# Patient Record
Sex: FEMALE | Race: OTHER | NOT HISPANIC OR LATINO | Employment: UNEMPLOYED | URBAN - METROPOLITAN AREA
[De-identification: names, ages, dates, MRNs, and addresses within clinical notes are randomized per-mention and may not be internally consistent; named-entity substitution may affect disease eponyms.]

---

## 2021-04-26 ENCOUNTER — HISTORICAL (OUTPATIENT)
Dept: ADMINISTRATIVE | Facility: HOSPITAL | Age: 31
End: 2021-04-26

## 2021-07-12 ENCOUNTER — HISTORICAL (OUTPATIENT)
Dept: ADMINISTRATIVE | Facility: HOSPITAL | Age: 31
End: 2021-07-12

## 2022-06-01 ENCOUNTER — OFFICE VISIT (OUTPATIENT)
Dept: ORTHOPEDICS | Facility: CLINIC | Age: 32
End: 2022-06-01
Payer: COMMERCIAL

## 2022-06-01 DIAGNOSIS — M75.81 TENDINITIS OF RIGHT ROTATOR CUFF: Primary | ICD-10-CM

## 2022-06-01 PROCEDURE — 99213 PR OFFICE/OUTPT VISIT, EST, LEVL III, 20-29 MIN: ICD-10-PCS | Mod: 25,,, | Performed by: SPECIALIST

## 2022-06-01 PROCEDURE — 20610 DRAIN/INJ JOINT/BURSA W/O US: CPT | Mod: RT,,, | Performed by: SPECIALIST

## 2022-06-01 PROCEDURE — 1159F MED LIST DOCD IN RCRD: CPT | Mod: CPTII,,, | Performed by: SPECIALIST

## 2022-06-01 PROCEDURE — 1160F PR REVIEW ALL MEDS BY PRESCRIBER/CLIN PHARMACIST DOCUMENTED: ICD-10-PCS | Mod: CPTII,,, | Performed by: SPECIALIST

## 2022-06-01 PROCEDURE — 1159F PR MEDICATION LIST DOCUMENTED IN MEDICAL RECORD: ICD-10-PCS | Mod: CPTII,,, | Performed by: SPECIALIST

## 2022-06-01 PROCEDURE — 20610 LARGE JOINT ASPIRATION/INJECTION: R SUBACROMIAL BURSA: ICD-10-PCS | Mod: RT,,, | Performed by: SPECIALIST

## 2022-06-01 PROCEDURE — 1160F RVW MEDS BY RX/DR IN RCRD: CPT | Mod: CPTII,,, | Performed by: SPECIALIST

## 2022-06-01 PROCEDURE — 99213 OFFICE O/P EST LOW 20 MIN: CPT | Mod: 25,,, | Performed by: SPECIALIST

## 2022-06-01 RX ORDER — IBUPROFEN 100 MG/5ML
1000 SUSPENSION, ORAL (FINAL DOSE FORM) ORAL
COMMUNITY

## 2022-06-01 RX ORDER — ZINC SULFATE 50(220)MG
220 CAPSULE ORAL
COMMUNITY

## 2022-06-01 RX ORDER — MONTELUKAST SODIUM 10 MG/1
10 TABLET ORAL DAILY PRN
COMMUNITY

## 2022-06-01 RX ORDER — BETAMETHASONE SODIUM PHOSPHATE AND BETAMETHASONE ACETATE 3; 3 MG/ML; MG/ML
12 INJECTION, SUSPENSION INTRA-ARTICULAR; INTRALESIONAL; INTRAMUSCULAR; SOFT TISSUE
Status: DISCONTINUED | OUTPATIENT
Start: 2022-06-01 | End: 2022-06-01 | Stop reason: HOSPADM

## 2022-06-01 RX ORDER — LIDOCAINE HYDROCHLORIDE 20 MG/ML
5 INJECTION, SOLUTION EPIDURAL; INFILTRATION; INTRACAUDAL; PERINEURAL
Status: DISCONTINUED | OUTPATIENT
Start: 2022-06-01 | End: 2022-06-01 | Stop reason: HOSPADM

## 2022-06-01 RX ORDER — CHOLECALCIFEROL (VITAMIN D3) 25 MCG
1000 TABLET ORAL
COMMUNITY

## 2022-06-01 RX ORDER — CYCLOBENZAPRINE HCL 10 MG
10 TABLET ORAL EVERY 8 HOURS PRN
COMMUNITY
Start: 2022-04-12

## 2022-06-01 RX ADMIN — BETAMETHASONE SODIUM PHOSPHATE AND BETAMETHASONE ACETATE 12 MG: 3; 3 INJECTION, SUSPENSION INTRA-ARTICULAR; INTRALESIONAL; INTRAMUSCULAR; SOFT TISSUE at 10:06

## 2022-06-01 RX ADMIN — LIDOCAINE HYDROCHLORIDE 5 ML: 20 INJECTION, SOLUTION EPIDURAL; INFILTRATION; INTRACAUDAL; PERINEURAL at 10:06

## 2022-06-01 NOTE — PROCEDURES
Large Joint Aspiration/Injection: R subacromial bursa    Date/Time: 6/1/2022 10:00 AM  Performed by: Sree Jay MD  Authorized by: Sree Jay MD     Consent Done?:  Yes (Verbal)  Indications:  Pain  Timeout: prior to procedure the correct patient, procedure, and site was verified      Local anesthesia used?: Yes    Anesthesia:  Local infiltration  Local anesthetic:  Lidocaine 2% without epinephrine    Details:  Needle Size:  25 G  Ultrasonic Guidance for needle placement?: No    Approach:  Posterior  Location:  Shoulder  Site:  R subacromial bursa  Medications:  5 mL LIDOcaine (PF) 20 mg/mL (2%) 20 mg/mL (2 %); 12 mg betamethasone acetate-betamethasone sodium phosphate 6 mg/mL  Patient tolerance:  Patient tolerated the procedure well with no immediate complications

## 2022-06-01 NOTE — LETTER
June 1, 2022      [facility]  [address], [address]  [facility] [address]  Phone: [phone]       Patient: Cooper Gaming   YOB: 1990  Date of Visit: 06/01/2022    To Whom It May Concern:    Thomas Gaming  was at [facility] on 06/01/2022 with light duty restrictions. If you have any questions or concerns, or if I can be of further assistance, please do not hesitate to contact me.    Sincerely,    Cindi Bellamy MA

## 2022-06-01 NOTE — PROGRESS NOTES
Chief Complaint:   Chief Complaint   Patient presents with    Pain     right wrist, states it will throb at time, pain is on/off, reports tingleness more in the palm aread of the hand     Pain     right shoulder, has been ongoing for about a year, describes pain is leading up to the neck, patient had MRI done of shoulder in 07/2021         History of present illness:    This is a 32 y.o. year old female who complains of RIGHT SHOUDLER PAIN      No past medical history on file.    No past surgical history on file.    Current Outpatient Medications   Medication Instructions    ascorbic acid (vitamin C) (VITAMIN C) 1,000 mg, Oral    cyclobenzaprine (FLEXERIL) 10 mg, Oral, Every 8 hours PRN    montelukast (SINGULAIR) 10 mg, Oral, Daily PRN    vitamin D (VITAMIN D3) 1,000 Units, Oral    zinc sulfate (ZINCATE) 220 mg, Oral        Review of patient's allergies indicates:   Allergen Reactions    Iodine and iodide containing products Hives, Itching, Rash, Shortness Of Breath and Swelling     Hives and itching, swelling      Shellfish containing products Shortness Of Breath     Hives, itching swelling and SOB       No family history on file.        Review of Systems:    Constitution:   Denies chills, fever, and sweats.  HENT:   Denies headaches or blurry vision.  Cardiovascular:  Denies chest pain or irregular heart beat.  Respiratory:   Denies cough or shortness of breath.  Gastrointestinal:  Denies abdominal pain, nausea, or vomiting.  Musculoskeletal:   Denies muscle cramps.  Neurological:   Denies dizziness or focal weakness.  Psychiatric/Behavior: Normal mental status.  Hematology/Lymph:  Denies bleeding problem or easy bruising/bleeding.  Skin:    Denies rash or suspicious lesions.    Examination:    Vital Signs:  There were no vitals filed for this visit.    There is no height or weight on file to calculate BMI.    Constitution:   Well-developed, well nourished patient in no acute distress.  Neurological:    Alert and oriented x 3 and cooperative to examination.     Psychiatric/Behavior: Normal mental status.  Respiratory:   No shortness of breath.  Eyes:    Extraoccular muscles intact  Skin:    No scars, rash or suspicious lesions.    Musculoskeletal Exam :   Right wrist exam  Patient does have some mild tenderness over the pisiform bone area  She has good motor and sensation  Brisk capillary refill of the digits  Intact range of motion  Negative Tinel's     Assessment: Final diagnoses:  None   Right shoulder rotator cuff tendinitis  Right wrist strain  Plan:  Right shoulder injection    If the patient's right wrist continues to bother her in a month which comes back for her shoulder we will repeat her x-rays     DISCLAIMER: This note may have been dictated using voice recognition software and may contain grammatical errors.     NOTE: Consult report sent to referring provider via [employer].

## 2022-06-07 ENCOUNTER — TELEPHONE (OUTPATIENT)
Dept: ORTHOPEDICS | Facility: CLINIC | Age: 32
End: 2022-06-07

## 2022-07-12 ENCOUNTER — OFFICE VISIT (OUTPATIENT)
Dept: ORTHOPEDICS | Facility: CLINIC | Age: 32
End: 2022-07-12
Payer: COMMERCIAL

## 2022-07-12 VITALS — HEIGHT: 62 IN | BODY MASS INDEX: 38.64 KG/M2 | WEIGHT: 210 LBS

## 2022-07-12 DIAGNOSIS — M75.81 TENDINITIS OF RIGHT ROTATOR CUFF: Primary | ICD-10-CM

## 2022-07-12 PROCEDURE — 1159F PR MEDICATION LIST DOCUMENTED IN MEDICAL RECORD: ICD-10-PCS | Mod: CPTII,,, | Performed by: SPECIALIST

## 2022-07-12 PROCEDURE — 1160F PR REVIEW ALL MEDS BY PRESCRIBER/CLIN PHARMACIST DOCUMENTED: ICD-10-PCS | Mod: CPTII,,, | Performed by: SPECIALIST

## 2022-07-12 PROCEDURE — 20610 LARGE JOINT ASPIRATION/INJECTION: R SUBACROMIAL BURSA: ICD-10-PCS | Mod: RT,,, | Performed by: SPECIALIST

## 2022-07-12 PROCEDURE — 1160F RVW MEDS BY RX/DR IN RCRD: CPT | Mod: CPTII,,, | Performed by: SPECIALIST

## 2022-07-12 PROCEDURE — 3008F PR BODY MASS INDEX (BMI) DOCUMENTED: ICD-10-PCS | Mod: CPTII,,, | Performed by: SPECIALIST

## 2022-07-12 PROCEDURE — 1159F MED LIST DOCD IN RCRD: CPT | Mod: CPTII,,, | Performed by: SPECIALIST

## 2022-07-12 PROCEDURE — 3008F BODY MASS INDEX DOCD: CPT | Mod: CPTII,,, | Performed by: SPECIALIST

## 2022-07-12 PROCEDURE — 99213 OFFICE O/P EST LOW 20 MIN: CPT | Mod: 25,,, | Performed by: SPECIALIST

## 2022-07-12 PROCEDURE — 99213 PR OFFICE/OUTPT VISIT, EST, LEVL III, 20-29 MIN: ICD-10-PCS | Mod: 25,,, | Performed by: SPECIALIST

## 2022-07-12 PROCEDURE — 20610 DRAIN/INJ JOINT/BURSA W/O US: CPT | Mod: RT,,, | Performed by: SPECIALIST

## 2022-07-12 RX ORDER — LIDOCAINE HYDROCHLORIDE 20 MG/ML
5 INJECTION, SOLUTION EPIDURAL; INFILTRATION; INTRACAUDAL; PERINEURAL
Status: DISCONTINUED | OUTPATIENT
Start: 2022-07-12 | End: 2022-07-12 | Stop reason: HOSPADM

## 2022-07-12 RX ORDER — BETAMETHASONE SODIUM PHOSPHATE AND BETAMETHASONE ACETATE 3; 3 MG/ML; MG/ML
12 INJECTION, SUSPENSION INTRA-ARTICULAR; INTRALESIONAL; INTRAMUSCULAR; SOFT TISSUE
Status: DISCONTINUED | OUTPATIENT
Start: 2022-07-12 | End: 2022-07-12 | Stop reason: HOSPADM

## 2022-07-12 RX ADMIN — BETAMETHASONE SODIUM PHOSPHATE AND BETAMETHASONE ACETATE 12 MG: 3; 3 INJECTION, SUSPENSION INTRA-ARTICULAR; INTRALESIONAL; INTRAMUSCULAR; SOFT TISSUE at 10:07

## 2022-07-12 RX ADMIN — LIDOCAINE HYDROCHLORIDE 5 ML: 20 INJECTION, SOLUTION EPIDURAL; INFILTRATION; INTRACAUDAL; PERINEURAL at 10:07

## 2022-07-12 NOTE — PROGRESS NOTES
"Chief Complaint:   Chief Complaint   Patient presents with    Follow-up     F/U AFTER INJECTION IN RIGHT SHOULDER, LAST INJECT WAS 6/1/22 WITH SOME RELIEF, REPORTS IT LASTED ONLY ABOUT A WEEK, SHE STATES THERE IS PAIN WHEN SHE TRIES TO REACH UP, SHE IS REQUESTING MRI          History of present illness:    This is a 32 y.o. year old female who complains of right shoulder pian  Injectio gave her good relief for about 2 weeks  Her pain has returned      History reviewed. No pertinent past medical history.    History reviewed. No pertinent surgical history.    Current Outpatient Medications   Medication Instructions    ascorbic acid (vitamin C) (VITAMIN C) 1,000 mg, Oral    cyclobenzaprine (FLEXERIL) 10 mg, Oral, Every 8 hours PRN    montelukast (SINGULAIR) 10 mg, Oral, Daily PRN    vitamin D (VITAMIN D3) 1,000 Units, Oral    zinc sulfate (ZINCATE) 220 mg, Oral        Review of patient's allergies indicates:   Allergen Reactions    Iodine and iodide containing products Hives, Itching, Rash, Shortness Of Breath and Swelling     Hives and itching, swelling      Shellfish containing products Shortness Of Breath     Hives, itching swelling and SOB       History reviewed. No pertinent family history.        Review of Systems:    Constitution:   Denies chills, fever, and sweats.  HENT:   Denies headaches or blurry vision.  Cardiovascular:  Denies chest pain or irregular heart beat.  Respiratory:   Denies cough or shortness of breath.  Gastrointestinal:  Denies abdominal pain, nausea, or vomiting.  Musculoskeletal:   Denies muscle cramps.  Neurological:   Denies dizziness or focal weakness.  Psychiatric/Behavior: Normal mental status.  Hematology/Lymph:  Denies bleeding problem or easy bruising/bleeding.  Skin:    Denies rash or suspicious lesions.    Examination:    Vital Signs:    Vitals:    07/12/22 1056   Weight: 95.3 kg (210 lb)   Height: 5' 2" (1.575 m)       Body mass index is 38.41 kg/m².    Constitution: "   Well-developed, well nourished patient in no acute distress.  Neurological:   Alert and oriented x 3 and cooperative to examination.     Psychiatric/Behavior: Normal mental status.  Respiratory:   No shortness of breath.  Eyes:    Extraoccular muscles intact  Skin:    No scars, rash or suspicious lesions.    Musculoskeletal Exam :   PHYSICAL FINDING      Neck:    NO Pain radiating to the shoulder Precervical         Shoulder: right  .   ° NO atrophy of the deltoid muscle   ° NO atrophy of the supraspinatus muscle   ° NO atrophy of the infraspinatus muscle         right Shoulder: .  Acromial tenderness on palpation.  Tenderness on palpation of the subacromial bursa.  Tenderness on palpation of the deltoid muscle.  Tenderness on palpation of the supraspinatus muscle.  Tenderness on palpation of the infraspinatus muscle.   Tenderness on palpation at the bicipital groove.  Tenderness on palpation of the trapezius muscle.  Subacromial crepitus on motion was noted.       Right Shoulder:      Shoulder Motion: Value Normal Range         Active forward flexion       180 degrees   Active external rotation     60 degrees   Active internal rotation      50 degrees      ° NO swelling medial sternoclavicular.   ° NO swelling.   ° NO induration.   ° NO erythema.   ° NO long head biceps deformity.   ° NO winged scapula.   ° Motion was normal.   °  pain was elicited during a Neer impingement test.   °  pain was elicited during a Tavera-Yanick impingement test.         Clavicle:      General/bilateral: ° Acromioclavicular joint showed NO pain elicited by motion distal.      Right Clavicle:  Right sternoclavicular joint showed tenderness on palpation.  Right acromioclavicular joint showed tenderness on palpation.      Left Clavicle:  Left sternoclavicular joint showed tenderness on palpation.  Left acromioclavicular joint showed tenderness on palpation.         Neurological:       NO abnormalities were noted  Sensibility intact distally on right.   ° Oriented to time, place, and person.      Sensation:   ° NO sensory exam abnormalities were noted Decreased median sensation.   ° NO sensory exam abnormalities were noted Decreased ulnar sensation.   ° NO sensory exam abnormalities were noted Decreased radial sensation.      Motor (Strength):   ° weakness of the right shoulder was observed with resisted abduction  ° NO weakness of the left shoulder was observed.      Skin:   ° NO ecchymosis on the shoulders left.   ° NO ecchymosis on the shoulders right.      Injury / Incision Site: ° NO scar.      TESTS      Imaging:   reviewed MRI from a year ago  Partial tear of the rotator cuff      Assessment: Tendinitis of right rotator cuff  -     Large Joint Aspiration/Injection: R subacromial bursa        Plan:  Will repeat injection right shoulder today  HEP      DISCLAIMER: This note may have been dictated using voice recognition software and may contain grammatical errors.     NOTE: Consult report sent to referring provider via [employer].

## 2022-07-12 NOTE — PROCEDURES
Large Joint Aspiration/Injection: R subacromial bursa    Date/Time: 7/12/2022 10:15 AM  Performed by: Sree Jay MD  Authorized by: Sree Jay MD     Consent Done?:  Yes (Verbal)  Indications:  Pain  Timeout: prior to procedure the correct patient, procedure, and site was verified      Local anesthesia used?: Yes    Anesthesia:  Local infiltration  Local anesthetic:  Lidocaine 2% without epinephrine    Details:  Needle Size:  25 G  Ultrasonic Guidance for needle placement?: No    Approach:  Posterior  Location:  Shoulder  Site:  R subacromial bursa  Medications:  5 mL LIDOcaine (PF) 20 mg/mL (2%) 20 mg/mL (2 %); 12 mg betamethasone acetate-betamethasone sodium phosphate 6 mg/mL  Patient tolerance:  Patient tolerated the procedure well with no immediate complications

## 2022-07-22 ENCOUNTER — OFFICE VISIT (OUTPATIENT)
Dept: ORTHOPEDICS | Facility: CLINIC | Age: 32
End: 2022-07-22
Payer: COMMERCIAL

## 2022-07-22 DIAGNOSIS — M25.519 SHOULDER PAIN, UNSPECIFIED CHRONICITY, UNSPECIFIED LATERALITY: ICD-10-CM

## 2022-07-22 DIAGNOSIS — M75.81 TENDINITIS OF RIGHT ROTATOR CUFF: Primary | ICD-10-CM

## 2022-07-22 DIAGNOSIS — M25.539 PAIN IN WRIST, UNSPECIFIED LATERALITY: ICD-10-CM

## 2022-07-22 PROCEDURE — 1160F PR REVIEW ALL MEDS BY PRESCRIBER/CLIN PHARMACIST DOCUMENTED: ICD-10-PCS | Mod: CPTII,,, | Performed by: PHYSICIAN ASSISTANT

## 2022-07-22 PROCEDURE — 1159F MED LIST DOCD IN RCRD: CPT | Mod: CPTII,,, | Performed by: PHYSICIAN ASSISTANT

## 2022-07-22 PROCEDURE — 99213 PR OFFICE/OUTPT VISIT, EST, LEVL III, 20-29 MIN: ICD-10-PCS | Mod: ,,, | Performed by: PHYSICIAN ASSISTANT

## 2022-07-22 PROCEDURE — 1159F PR MEDICATION LIST DOCUMENTED IN MEDICAL RECORD: ICD-10-PCS | Mod: CPTII,,, | Performed by: PHYSICIAN ASSISTANT

## 2022-07-22 PROCEDURE — 99213 OFFICE O/P EST LOW 20 MIN: CPT | Mod: ,,, | Performed by: PHYSICIAN ASSISTANT

## 2022-07-22 PROCEDURE — 1160F RVW MEDS BY RX/DR IN RCRD: CPT | Mod: CPTII,,, | Performed by: PHYSICIAN ASSISTANT
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
## 2022-07-22 NOTE — LETTER
[facility]  [address] [place] [address] [address]  [place], [address]  Phone: [phone]  Fax: [phone]    Name:Cooper Gaming  :1990   Date:2022     PATIENT IS UNABLE TO WORK AS OF: 2022  [_] Pending treatment.  [X] For approximately [_] Days [3] Weeks [_] Months  [X] Pending diagnostic testing.  [_] Pending surgical treatment.  [_] For approximately _ months (Post Surgery)    PATIENT IS ABLE TO RETURN TO WORK AS OF:*    RE EVALUATION IN THREE WEEKS OF HER NEXT APPOINTMENT         COMMENTS      NEREYDA ODONNELL PA-C

## 2022-08-12 ENCOUNTER — TELEPHONE (OUTPATIENT)
Dept: ORTHOPEDICS | Facility: CLINIC | Age: 32
End: 2022-08-12
Payer: COMMERCIAL

## 2022-08-12 NOTE — TELEPHONE ENCOUNTER
Patient called stating that she is not able to proceed with doing her MRI because she would have to pay out of pocket, she instead would like to come in on her scheduled appointment to discuss about her hand.

## 2022-08-16 ENCOUNTER — OFFICE VISIT (OUTPATIENT)
Dept: ORTHOPEDICS | Facility: CLINIC | Age: 32
End: 2022-08-16
Payer: COMMERCIAL

## 2022-08-16 VITALS — BODY MASS INDEX: 38.64 KG/M2 | HEIGHT: 62 IN | WEIGHT: 210 LBS

## 2022-08-16 DIAGNOSIS — M75.81 TENDINITIS OF RIGHT ROTATOR CUFF: ICD-10-CM

## 2022-08-16 DIAGNOSIS — G56.01 CARPAL TUNNEL SYNDROME, RIGHT: Primary | ICD-10-CM

## 2022-08-16 PROCEDURE — 3008F BODY MASS INDEX DOCD: CPT | Mod: CPTII,,, | Performed by: SPECIALIST

## 2022-08-16 PROCEDURE — 99213 PR OFFICE/OUTPT VISIT, EST, LEVL III, 20-29 MIN: ICD-10-PCS | Mod: ,,, | Performed by: SPECIALIST

## 2022-08-16 PROCEDURE — 1160F PR REVIEW ALL MEDS BY PRESCRIBER/CLIN PHARMACIST DOCUMENTED: ICD-10-PCS | Mod: CPTII,,, | Performed by: SPECIALIST

## 2022-08-16 PROCEDURE — 1159F PR MEDICATION LIST DOCUMENTED IN MEDICAL RECORD: ICD-10-PCS | Mod: CPTII,,, | Performed by: SPECIALIST

## 2022-08-16 PROCEDURE — 1159F MED LIST DOCD IN RCRD: CPT | Mod: CPTII,,, | Performed by: SPECIALIST

## 2022-08-16 PROCEDURE — 3008F PR BODY MASS INDEX (BMI) DOCUMENTED: ICD-10-PCS | Mod: CPTII,,, | Performed by: SPECIALIST

## 2022-08-16 PROCEDURE — 1160F RVW MEDS BY RX/DR IN RCRD: CPT | Mod: CPTII,,, | Performed by: SPECIALIST

## 2022-08-16 PROCEDURE — 99213 OFFICE O/P EST LOW 20 MIN: CPT | Mod: ,,, | Performed by: SPECIALIST

## 2022-08-16 NOTE — PROGRESS NOTES
"Chief Complaint:   Chief Complaint   Patient presents with    Follow-up     right hand/shoulder, patient was unable to do her MRI, right shoulder last inject was 7/12/22, right hand numbness, pt states no changes to right shoulder.          History of present illness:    This is a 32 y.o. year old female who complains of right hand numbness now as her main c/o  Shoulder symptoms remain the same    Has not done her MRI of the shoulder or wrist      History reviewed. No pertinent past medical history.    History reviewed. No pertinent surgical history.    Current Outpatient Medications   Medication Instructions    ascorbic acid (vitamin C) (VITAMIN C) 1,000 mg, Oral    cyclobenzaprine (FLEXERIL) 10 mg, Oral, Every 8 hours PRN    montelukast (SINGULAIR) 10 mg, Oral, Daily PRN    vitamin D (VITAMIN D3) 1,000 Units, Oral    zinc sulfate (ZINCATE) 220 mg, Oral        Review of patient's allergies indicates:   Allergen Reactions    Iodine and iodide containing products Hives, Itching, Rash, Shortness Of Breath and Swelling     Hives and itching, swelling      Shellfish containing products Shortness Of Breath     Hives, itching swelling and SOB       History reviewed. No pertinent family history.        Review of Systems:    Constitution:   Denies chills, fever, and sweats.  HENT:   Denies headaches or blurry vision.  Cardiovascular:  Denies chest pain or irregular heart beat.  Respiratory:   Denies cough or shortness of breath.  Gastrointestinal:  Denies abdominal pain, nausea, or vomiting.  Musculoskeletal:   Denies muscle cramps.  Neurological:   Denies dizziness or focal weakness.  Psychiatric/Behavior: Normal mental status.  Hematology/Lymph:  Denies bleeding problem or easy bruising/bleeding.  Skin:    Denies rash or suspicious lesions.    Examination:    Vital Signs:    Vitals:    08/16/22 1356   Weight: 95.3 kg (210 lb)   Height: 5' 2" (1.575 m)       Body mass index is 38.41 kg/m².    Constitution: "   Well-developed, well nourished patient in no acute distress.  Neurological:   Alert and oriented x 3 and cooperative to examination.     Psychiatric/Behavior: Normal mental status.  Respiratory:   No shortness of breath.  Eyes:    Extraoccular muscles intact  Skin:    No scars, rash or suspicious lesions.    Musculoskeletal Exam :   No obvious deformity.  Negative tenderness over distal radius.  Supination and pronation to 90 degrees and 90 degrees, respectively.  Wrist flexion to 90 degrees and wrist extension to 70 degree.  Mildly positive Tinel's test.  Negative Finkelstein's test.  5/5 strength, normal skin appearance and palpable pulses    right Shoulder:     No swelling, erythema or increased heat    Tender over deltoid, supraspinatus and infraspinatus    Tender over bicipital groove    negative drop arm test Positive Neer and Tavera impingement signs    No weakness with rotator cuff resistance   Active shoulder abduction 100  Active forward elevation 180  Active internal rotation 40   Active external rotation 50       Assessment: Carpal tunnel syndrome, right    Tendinitis of right rotator cuff        Plan:    Continue with [facility] for shoulder stretching  Brace at night for her mild CTS symptoms    Will f/u prn    Return to work 8/22/22      DISCLAIMER: This note may have been dictated using voice recognition software and may contain grammatical errors.     NOTE: Consult report sent to referring provider via [employer].

## 2022-08-19 ENCOUNTER — TELEPHONE (OUTPATIENT)
Dept: ORTHOPEDICS | Facility: CLINIC | Age: 32
End: 2022-08-19
Payer: COMMERCIAL

## 2022-08-19 NOTE — TELEPHONE ENCOUNTER
Patient had called left a  regarding MRI, I returned patients call, she does not have a voicemail setup,

## 2022-09-01 ENCOUNTER — DOCUMENTATION ONLY (OUTPATIENT)
Dept: ORTHOPEDICS | Facility: CLINIC | Age: 32
End: 2022-09-01
Payer: COMMERCIAL

## 2022-12-20 ENCOUNTER — HOSPITAL ENCOUNTER (EMERGENCY)
Facility: HOSPITAL | Age: 32
Discharge: LEFT BEFORE DISCHARGE | End: 2022-12-20
Attending: FAMILY MEDICINE
Payer: MEDICAID

## 2022-12-20 VITALS
DIASTOLIC BLOOD PRESSURE: 84 MMHG | WEIGHT: 216.5 LBS | HEIGHT: 62 IN | BODY MASS INDEX: 39.84 KG/M2 | TEMPERATURE: 97 F | RESPIRATION RATE: 20 BRPM | HEART RATE: 110 BPM | OXYGEN SATURATION: 98 % | SYSTOLIC BLOOD PRESSURE: 145 MMHG

## 2022-12-20 LAB
APPEARANCE UR: ABNORMAL
B-HCG UR QL: NEGATIVE
BILIRUB UR QL STRIP.AUTO: ABNORMAL
COLOR UR AUTO: ABNORMAL
CTP QC/QA: YES
GLUCOSE UR QL STRIP.AUTO: ABNORMAL
KETONES UR QL STRIP.AUTO: ABNORMAL
LEUKOCYTE ESTERASE UR QL STRIP.AUTO: ABNORMAL
NITRITE UR QL STRIP.AUTO: ABNORMAL
PH UR STRIP.AUTO: 6.5 [PH]
PROT UR QL STRIP.AUTO: ABNORMAL
RBC #/AREA URNS AUTO: >100 /HPF
RBC UR QL AUTO: ABNORMAL UNIT/L
SP GR UR STRIP.AUTO: 1.02
SQUAMOUS #/AREA URNS LPF: ABNORMAL /HPF
UROBILINOGEN UR STRIP-ACNC: ABNORMAL
WBC #/AREA URNS AUTO: ABNORMAL /HPF

## 2022-12-20 PROCEDURE — 99900041 HC LEFT WITHOUT BEING SEEN- EMERGENCY

## 2022-12-20 PROCEDURE — 81025 URINE PREGNANCY TEST: CPT | Performed by: FAMILY MEDICINE

## 2022-12-20 PROCEDURE — 81001 URINALYSIS AUTO W/SCOPE: CPT | Performed by: FAMILY MEDICINE

## 2022-12-20 PROCEDURE — 87088 URINE BACTERIA CULTURE: CPT | Performed by: FAMILY MEDICINE

## 2022-12-22 LAB
BACTERIA UR CULT: ABNORMAL

## 2023-03-23 ENCOUNTER — LAB REQUISITION (OUTPATIENT)
Dept: LAB | Facility: HOSPITAL | Age: 33
End: 2023-03-23
Payer: MEDICAID

## 2023-03-23 DIAGNOSIS — H60.392 OTHER INFECTIVE OTITIS EXTERNA, LEFT EAR: ICD-10-CM

## 2023-03-23 PROCEDURE — 87102 FUNGUS ISOLATION CULTURE: CPT | Performed by: OTOLARYNGOLOGY

## 2023-03-23 PROCEDURE — 87205 SMEAR GRAM STAIN: CPT | Performed by: OTOLARYNGOLOGY

## 2023-03-23 PROCEDURE — 87070 CULTURE OTHR SPECIMN AEROBIC: CPT | Performed by: OTOLARYNGOLOGY

## 2023-03-24 LAB — GRAM STN SPEC: NORMAL

## 2023-04-17 LAB — BACTERIA DEEP WND CULT: ABNORMAL

## 2023-04-20 ENCOUNTER — TELEPHONE (OUTPATIENT)
Dept: ORTHOPEDICS | Facility: CLINIC | Age: 33
End: 2023-04-20
Payer: MEDICAID

## 2023-04-24 LAB — FUNGUS SPEC CULT: NORMAL

## 2024-01-23 ENCOUNTER — HOSPITAL ENCOUNTER (OUTPATIENT)
Dept: RADIOLOGY | Facility: CLINIC | Age: 34
Discharge: HOME | End: 2024-01-23
Attending: SPECIALIST
Payer: COMMERCIAL

## 2024-01-23 ENCOUNTER — OFFICE VISIT (OUTPATIENT)
Dept: ORTHOPEDICS | Facility: CLINIC | Age: 34
End: 2024-01-23
Payer: COMMERCIAL

## 2024-01-23 VITALS
DIASTOLIC BLOOD PRESSURE: 85 MMHG | WEIGHT: 228.63 LBS | HEART RATE: 83 BPM | BODY MASS INDEX: 42.07 KG/M2 | HEIGHT: 62 IN | SYSTOLIC BLOOD PRESSURE: 129 MMHG

## 2024-01-23 DIAGNOSIS — M25.511 RIGHT SHOULDER PAIN, UNSPECIFIED CHRONICITY: Primary | ICD-10-CM

## 2024-01-23 DIAGNOSIS — S93.401A SPRAIN OF RIGHT ANKLE, UNSPECIFIED LIGAMENT, INITIAL ENCOUNTER: ICD-10-CM

## 2024-01-23 DIAGNOSIS — M25.571 RIGHT ANKLE PAIN, UNSPECIFIED CHRONICITY: ICD-10-CM

## 2024-01-23 DIAGNOSIS — M75.111 NONTRAUMATIC INCOMPLETE TEAR OF RIGHT ROTATOR CUFF: ICD-10-CM

## 2024-01-23 DIAGNOSIS — M25.511 RIGHT SHOULDER PAIN, UNSPECIFIED CHRONICITY: ICD-10-CM

## 2024-01-23 DIAGNOSIS — M75.81 TENDINITIS OF RIGHT ROTATOR CUFF: ICD-10-CM

## 2024-01-23 PROCEDURE — 73030 X-RAY EXAM OF SHOULDER: CPT | Mod: RT,,, | Performed by: SPECIALIST

## 2024-01-23 PROCEDURE — 3008F BODY MASS INDEX DOCD: CPT | Mod: CPTII,,, | Performed by: SPECIALIST

## 2024-01-23 PROCEDURE — 1159F MED LIST DOCD IN RCRD: CPT | Mod: CPTII,,, | Performed by: SPECIALIST

## 2024-01-23 PROCEDURE — 73610 X-RAY EXAM OF ANKLE: CPT | Mod: RT,,, | Performed by: SPECIALIST

## 2024-01-23 PROCEDURE — 3079F DIAST BP 80-89 MM HG: CPT | Mod: CPTII,,, | Performed by: SPECIALIST

## 2024-01-23 PROCEDURE — 3074F SYST BP LT 130 MM HG: CPT | Mod: CPTII,,, | Performed by: SPECIALIST

## 2024-01-23 PROCEDURE — 1160F RVW MEDS BY RX/DR IN RCRD: CPT | Mod: CPTII,,, | Performed by: SPECIALIST

## 2024-01-23 PROCEDURE — 99213 OFFICE O/P EST LOW 20 MIN: CPT | Mod: ,,, | Performed by: SPECIALIST

## 2024-01-23 RX ORDER — MELOXICAM 7.5 MG/1
7.5 TABLET ORAL DAILY
Qty: 30 TABLET | Refills: 1 | Status: SHIPPED | OUTPATIENT
Start: 2024-01-23

## 2024-01-23 NOTE — PROGRESS NOTES
Chief Complaint:   Chief Complaint   Patient presents with    Shoulder Pain     R shoulder states she has been having increase pain. States she has been using ten units. Sometimes her ROM is limited. States her pain travels down from the neck down to her elbow.     Ankle Pain     R ankle DOI was about 4 wks ago. States she rolled her ankle. States it has been swollen. She has been doing stretches with some pain and some relief. Denies numbness/tingling           History of present illness:    This is a 33 y.o. year old female who complains of right shoulder pain  She is a point she would like to have surgery if it would help her pain    Having right ankle pain after she rolled it      History reviewed. No pertinent past medical history.    History reviewed. No pertinent surgical history.    Current Outpatient Medications   Medication Instructions    ascorbic acid (vitamin C) (VITAMIN C) 1,000 mg, Oral    cyclobenzaprine (FLEXERIL) 10 mg, Oral, Every 8 hours PRN    montelukast (SINGULAIR) 10 mg, Oral, Daily PRN    vitamin D (VITAMIN D3) 1,000 Units, Oral    zinc sulfate (ZINCATE) 220 mg, Oral        Review of patient's allergies indicates:   Allergen Reactions    Iodine and iodide containing products Hives, Itching, Rash, Shortness Of Breath and Swelling     Hives and itching, swelling      Shellfish containing products Shortness Of Breath     Hives, itching swelling and SOB       Family History   Problem Relation Age of Onset    Hypertension Father            Review of Systems:    Constitution:   Denies chills, fever, and sweats.  HENT:   Denies headaches or blurry vision.  Cardiovascular:  Denies chest pain or irregular heart beat.  Respiratory:   Denies cough or shortness of breath.  Gastrointestinal:  Denies abdominal pain, nausea, or vomiting.  Musculoskeletal:   Denies muscle cramps.  Neurological:   Denies dizziness or focal weakness.  Psychiatric/Behavior: Normal mental status.  Hematology/Lymph:  Denies  "bleeding problem or easy bruising/bleeding.  Skin:    Denies rash or suspicious lesions.    Examination:    Vital Signs:    Vitals:    01/23/24 0821   BP: 129/85   Pulse: 83   Weight: 103.7 kg (228 lb 9.6 oz)   Height: 5' 2" (1.575 m)       Body mass index is 41.81 kg/m².    Constitution:   Well-developed, well nourished patient in no acute distress.  Neurological:   Alert and oriented x 3 and cooperative to examination.     Psychiatric/Behavior: Normal mental status.  Respiratory:   No shortness of breath.  Eyes:    Extraoccular muscles intact  Skin:    No scars, rash or suspicious lesions.    Musculoskeletal Exam :   right Shoulder:     No swelling, erythema or increased heat    Tender over deltoid, supraspinatus and infraspinatus    Tender over bicipital groove    negative drop arm test     Positive Neer and Tavera impingement signs    Pain and  weakness with rotator cuff resistance   Active shoulder abduction 90  Active forward elevation 170  Active internal rotation 40   Active external rotation 50       X-rays: 4 views right shoulder taken today  No acute osseous abnormalities noted  Well maintained joint spaces       right ankle  No obvious deformity. Plantar flexion and dorsiflexion is 60 degrees and 30 degrees, respectively.  Negative squeeze test.  Negative lateral malleolus tenderness.  Negative medial malleolus tenderness.  positive talofibular ligament tenderness.  Negative anterior draw and lateral tilt.  5/5 strength, normal skin appearance and palpable pulses distally.  Sensibility normal.    Right ankle x-rays teken today 3 views  No acute bony abnormalities    Assessment: Right shoulder pain, unspecified chronicity  -     X-ray Shoulder 2 or More Views Right; Future; Expected date: 01/23/2024    Right ankle pain, unspecified chronicity  -     X-Ray Ankle Complete Right; Future; Expected date: 01/23/2024        Plan:  would need to repeat MRI as it is over 2 years old      DISCLAIMER: This note may " have been dictated using voice recognition software and may contain grammatical errors.     NOTE: Consult report sent to referring provider via [employer] E[employer].